# Patient Record
Sex: MALE | Race: WHITE | NOT HISPANIC OR LATINO | Employment: OTHER | URBAN - METROPOLITAN AREA
[De-identification: names, ages, dates, MRNs, and addresses within clinical notes are randomized per-mention and may not be internally consistent; named-entity substitution may affect disease eponyms.]

---

## 2023-06-09 ENCOUNTER — OFFICE VISIT (OUTPATIENT)
Dept: PRIMARY CARE CLINIC | Facility: CLINIC | Age: 78
End: 2023-06-09
Payer: MEDICARE

## 2023-06-09 VITALS
WEIGHT: 315 LBS | HEIGHT: 74 IN | HEART RATE: 54 BPM | OXYGEN SATURATION: 97 % | BODY MASS INDEX: 40.43 KG/M2 | DIASTOLIC BLOOD PRESSURE: 64 MMHG | RESPIRATION RATE: 16 BRPM | TEMPERATURE: 98 F | SYSTOLIC BLOOD PRESSURE: 103 MMHG

## 2023-06-09 DIAGNOSIS — Z85.46 HISTORY OF PROSTATE CANCER: ICD-10-CM

## 2023-06-09 DIAGNOSIS — E78.2 MIXED HYPERLIPIDEMIA: ICD-10-CM

## 2023-06-09 DIAGNOSIS — M1A.09X0 CHRONIC GOUT OF MULTIPLE SITES, UNSPECIFIED CAUSE: ICD-10-CM

## 2023-06-09 DIAGNOSIS — Z11.59 NEED FOR HEPATITIS C SCREENING TEST: ICD-10-CM

## 2023-06-09 DIAGNOSIS — G62.9 PERIPHERAL POLYNEUROPATHY: ICD-10-CM

## 2023-06-09 DIAGNOSIS — R68.89 OTHER GENERAL SYMPTOMS AND SIGNS: ICD-10-CM

## 2023-06-09 DIAGNOSIS — I48.91 ATRIAL FIBRILLATION, UNSPECIFIED TYPE: Primary | ICD-10-CM

## 2023-06-09 DIAGNOSIS — E66.01 MORBID OBESITY: ICD-10-CM

## 2023-06-09 PROCEDURE — 99204 OFFICE O/P NEW MOD 45 MIN: CPT | Mod: S$GLB,,, | Performed by: INTERNAL MEDICINE

## 2023-06-09 PROCEDURE — 99204 PR OFFICE/OUTPT VISIT, NEW, LEVL IV, 45-59 MIN: ICD-10-PCS | Mod: S$GLB,,, | Performed by: INTERNAL MEDICINE

## 2023-06-09 RX ORDER — APIXABAN 5 MG/1
1 TABLET, FILM COATED ORAL DAILY
COMMUNITY
Start: 2023-05-17 | End: 2023-07-06 | Stop reason: SDUPTHER

## 2023-06-09 RX ORDER — FLECAINIDE ACETATE 50 MG/1
1 TABLET ORAL 2 TIMES DAILY
COMMUNITY
Start: 2023-05-17

## 2023-06-09 RX ORDER — LISINOPRIL 10 MG/1
1 TABLET ORAL DAILY
COMMUNITY
Start: 2023-05-17 | End: 2024-02-08

## 2023-06-09 RX ORDER — ATROPINE SULFATE 10 MG/ML
1 SOLUTION/ DROPS OPHTHALMIC DAILY
COMMUNITY
Start: 2023-03-15

## 2023-06-09 RX ORDER — PREDNISOLONE ACETATE 10 MG/ML
1 SUSPENSION/ DROPS OPHTHALMIC DAILY
COMMUNITY
Start: 2023-03-06

## 2023-06-09 RX ORDER — ALLOPURINOL 300 MG/1
1 TABLET ORAL DAILY
COMMUNITY
Start: 2023-05-24

## 2023-06-09 RX ORDER — PREGABALIN 150 MG/1
1 CAPSULE ORAL 3 TIMES DAILY
COMMUNITY
Start: 2023-05-18 | End: 2023-09-05 | Stop reason: SDUPTHER

## 2023-06-09 RX ORDER — PRAVASTATIN SODIUM 40 MG/1
40 TABLET ORAL DAILY
COMMUNITY

## 2023-06-09 NOTE — PROGRESS NOTES
Subjective:      Patient ID: Gulshan Winkler is a 78 y.o. male.    Chief Complaint: Establish Care (Est care, discuss medication, patient reports hx neuropathy, reports moving from [place] )    HPI:  Patient is here to establish care.  Patient has moved from [place], [place].    Patient has history of atrial fibrillation s/p ablation x 3.  Last ablation was 2 years ago and reports he reported back to normal sinus rhythm and no more episode of rapid atrial fibrillation noted.  Patient is still on flecainide and Eliquis.  Patient denies any palpitation, chest pain, shortness some breath, no blood in stool or black color stool    Patient has questionable history of gout and is on allopurinol.  Has bilateral hand small joint involvement.     Patient has burning in bilateral feet x 1 year.  Feeling is constant, worse at night, nerve conduction study was not done.  Patient is using Lyrica with some help.     Review of Systems   Constitutional:  Negative for chills, diaphoresis, fever, malaise/fatigue and weight loss.   HENT:  Negative for congestion, ear pain, sinus pain, sore throat and tinnitus.    Eyes:  Negative for blurred vision and photophobia.   Respiratory:  Negative for cough, hemoptysis, shortness of breath and wheezing.    Cardiovascular:  Negative for chest pain, palpitations, orthopnea, leg swelling and PND.   Gastrointestinal:  Negative for abdominal pain, blood in stool, constipation, diarrhea, heartburn, melena, nausea and vomiting.   Genitourinary:  Negative for dysuria, frequency and urgency.   Musculoskeletal:  Negative for back pain, myalgias and neck pain.   Skin:  Negative for rash.   Neurological:  Negative for dizziness, tremors, seizures, loss of consciousness and weakness.   Endo/Heme/Allergies:  Negative for polydipsia.   Psychiatric/Behavioral:  Negative for depression and hallucinations. The patient does not have insomnia.    Objective:   /64 (BP Location: Left arm, Patient Position:  "Sitting, BP Method: Large (Automatic))   Pulse (!) 54   Temp 97.9 °F (36.6 °C) (Temporal)   Resp 16   Ht 6' 2" (1.88 m)   Wt (!) 150.1 kg (331 lb)   SpO2 97%   BMI 42.50 kg/m²   Physical Exam  Constitutional:       General: He is not in acute distress.     Appearance: He is not diaphoretic.   Neck:      Thyroid: No thyromegaly.   Cardiovascular:      Rate and Rhythm: Normal rate and regular rhythm.      Heart sounds: Normal heart sounds. No murmur heard.  Pulmonary:      Effort: Pulmonary effort is normal. No respiratory distress.      Breath sounds: Normal breath sounds. No wheezing.   Abdominal:      General: Bowel sounds are normal. There is no distension.      Palpations: Abdomen is soft.      Tenderness: There is no abdominal tenderness.   Musculoskeletal:      Comments: Multiple Heberden and Priscilla's nodules in bilateral hands   Lymphadenopathy:      Cervical: No cervical adenopathy.   Neurological:      Mental Status: He is alert and oriented to person, place, and time.   Psychiatric:         Behavior: Behavior normal.         Thought Content: Thought content normal.         Judgment: Judgment normal.   Assessment:       ICD-10-CM ICD-9-CM   1. Morbid obesity  E66.01 278.01   2. Atrial fibrillation, unspecified type  I48.91 427.31   3. Chronic gout of multiple sites, unspecified cause  M1A.09X0 274.02   4. Mixed hyperlipidemia  E78.2 272.2   5. Prostate cancer screening  Z12.5 V76.44   6. Need for hepatitis C screening test  Z11.59 V73.89       Plan:     Patient has history of atrial fibrillation s/p ablation  Patient reports heart rate is under good control and denies any symptoms suggestive of atrial fibrillation.  Patient is using Apple watch and denies Apple watch picked up any atrial fibrillation  Will refer to cardiology for Holter monitor before stopping Eliquis and flecainide  Patient has questionable gout is on allopurinol  Will check uric acid levels  Patient has hyperlipidemia and is on " pravastatin.  Will check lipid profile  Patient has symptoms suggestive of peripheral neuropathy.  Will check nerve conduction study  Patient has history of prostate cancer s/p prostatectomy  Will check PSA  Will screen patient for hepatitis-C  Advised patient about need to lose weight + minimize carbohydrate consumption      Medication List with Changes/Refills   Current Medications    ALLOPURINOL (ZYLOPRIM) 300 MG TABLET    Take 1 tablet by mouth once daily.    ATROPINE 1% (ISOPTO ATROPINE) 1 % DROP    Place 1 drop into both eyes once daily.    ELIQUIS 5 MG TAB    Take 1 tablet by mouth once daily.    FLECAINIDE (TAMBOCOR) 50 MG TAB    Take 1 tablet by mouth 2 (two) times a day.    LISINOPRIL 10 MG TABLET    Take 1 tablet by mouth once daily.    PRAVASTATIN (PRAVACHOL) 40 MG TABLET    Take 40 mg by mouth once daily.    PREDNISOLONE ACETATE (PRED FORTE) 1 % DRPS    Place 1 drop into both eyes once daily.    PREGABALIN (LYRICA) 150 MG CAPSULE    Take 1 capsule by mouth 3 (three) times daily.

## 2023-06-13 LAB
ABS NRBC COUNT: 0 THOU/UL (ref 0–0.01)
ABSOLUTE BASOPHIL: 0.1 10*3/UL (ref 0–0.3)
ABSOLUTE EOSINOPHIL: 0.2 10*3/UL (ref 0–0.6)
ABSOLUTE IMMATURE GRAN: 0.06 THOU/UL (ref 0–0.03)
ABSOLUTE LYMPHOCYTE: 1.7 10*3/UL (ref 1.2–4)
ABSOLUTE MONOCYTE: 0.7 10*3/UL (ref 0.1–0.8)
ALBUMIN SERPL BCP-MCNC: 3.7 G/DL (ref 3.4–5)
ALP SERPL-CCNC: 63 U/L (ref 45–117)
ALT SERPL W P-5'-P-CCNC: 28 U/L (ref 16–61)
ANION GAP SERPL CALC-SCNC: 2 MMOL/L (ref 3–11)
AST SERPL-CCNC: 28 U/L (ref 15–37)
BASOPHILS NFR BLD: 1.5 % (ref 0–3)
BILIRUB SERPL-MCNC: 0.8 MG/DL (ref 0.2–1)
BUN SERPL-MCNC: 20 MG/DL (ref 7–18)
BUN/CREAT SERPL: 18.69 RATIO
CALCIUM SERPL-MCNC: 8.9 MG/DL (ref 8.5–10.1)
CHLORIDE SERPL-SCNC: 108 MMOL/L (ref 98–107)
CHOLEST SERPL-MSCNC: 113 MG/DL
CO2 SERPL-SCNC: 24 MMOL/L (ref 21–32)
CREAT SERPL-MCNC: 1.07 MG/DL (ref 0.7–1.3)
EOSINOPHIL NFR BLD: 3.7 % (ref 0–6)
ERYTHROCYTE [DISTWIDTH] IN BLOOD BY AUTOMATED COUNT: 14.2 % (ref 0–15.5)
GFR ESTIMATION: > 60
GLUCOSE SERPL-MCNC: 101 MG/DL (ref 74–106)
HCT VFR BLD AUTO: 48.6 % (ref 42–52)
HCV AB SERPL QL IA: NONREACTIVE
HDLC SERPL-MCNC: 39 MG/DL
HGB BLD-MCNC: 16.2 G/DL (ref 14–18)
IMMATURE GRANULOCYTES: 0.9 % (ref 0–0.43)
LDLC SERPL CALC-MCNC: 49.6 MG/DL
LYMPHOCYTES NFR BLD: 25.7 % (ref 20–45)
MCH RBC QN AUTO: 31.3 PG (ref 27–32)
MCHC RBC AUTO-ENTMCNC: 33.3 % (ref 32–36)
MCV RBC AUTO: 94 FL (ref 80–97)
MONOCYTES NFR BLD: 10.3 % (ref 2–10)
NEUTROPHILS # BLD AUTO: 3.8 10*3/UL (ref 1.4–7)
NEUTROPHILS NFR BLD: 57.9 % (ref 50–80)
NUCLEATED RED BLOOD CELLS: 0 % (ref 0–0.2)
PLATELETS: 148 10*3/UL (ref 130–400)
PMV BLD AUTO: 10.9 FL (ref 9.2–12.2)
POTASSIUM SERPL-SCNC: 4.4 MMOL/L (ref 3.5–5.1)
PROT SERPL-MCNC: 7.5 G/DL (ref 6.4–8.2)
PSA: < 0.01 NG/ML (ref 0.1–4)
RBC # BLD AUTO: 5.17 10*6/UL (ref 4.7–6.1)
SODIUM BLD-SCNC: 134 MMOL/L (ref 131–143)
TRIGL SERPL-MCNC: 122 MG/DL (ref 0–149)
TSH SERPL DL<=0.005 MIU/L-ACNC: 0.95 UIU/ML (ref 0.36–3.74)
URATE SERPL-MCNC: 6.2 MG/DL (ref 3.5–7.2)
VLDL CHOLESTEROL: 24 MG/DL
WBC # BLD: 6.5 10*3/UL (ref 4.5–10)

## 2023-07-06 ENCOUNTER — OFFICE VISIT (OUTPATIENT)
Dept: PRIMARY CARE CLINIC | Facility: CLINIC | Age: 78
End: 2023-07-06
Payer: MEDICARE

## 2023-07-06 VITALS
DIASTOLIC BLOOD PRESSURE: 72 MMHG | HEIGHT: 74 IN | SYSTOLIC BLOOD PRESSURE: 114 MMHG | TEMPERATURE: 97 F | HEART RATE: 73 BPM | BODY MASS INDEX: 40.43 KG/M2 | RESPIRATION RATE: 16 BRPM | OXYGEN SATURATION: 98 % | WEIGHT: 315 LBS

## 2023-07-06 DIAGNOSIS — E66.01 MORBID OBESITY: Primary | ICD-10-CM

## 2023-07-06 DIAGNOSIS — I48.91 ATRIAL FIBRILLATION, UNSPECIFIED TYPE: ICD-10-CM

## 2023-07-06 DIAGNOSIS — M1A.09X0 CHRONIC GOUT OF MULTIPLE SITES, UNSPECIFIED CAUSE: ICD-10-CM

## 2023-07-06 PROCEDURE — 99214 PR OFFICE/OUTPT VISIT, EST, LEVL IV, 30-39 MIN: ICD-10-PCS | Mod: S$GLB,,, | Performed by: INTERNAL MEDICINE

## 2023-07-06 PROCEDURE — 99214 OFFICE O/P EST MOD 30 MIN: CPT | Mod: S$GLB,,, | Performed by: INTERNAL MEDICINE

## 2023-07-06 RX ORDER — APIXABAN 5 MG/1
5 TABLET, FILM COATED ORAL 2 TIMES DAILY
Qty: 60 TABLET | Refills: 1 | Status: SHIPPED | OUTPATIENT
Start: 2023-07-06

## 2023-07-06 NOTE — PROGRESS NOTES
Subjective:      Patient ID: Gulshan Winkler is a 78 y.o. male.    Chief Complaint: Follow-up (F/u discuss need to continue blood thinner, states he is taking his Eloquis 5mg every other day) and Leg Pain (C/o BLE numbness, hx of neuropathy, discuss need for test)    Patient has history of atrial fibrillation s/p ablation x 3.  Last ablation was 2 years ago and reports he has been in normal sinus rhythm since then.  Patient is still on flecainide and Eliquis.  Patient was referred to Cardiology on last visit for possible Holter monitor and evaluation of anti arrhythmic medication.  Patient reports he has not yet received an appointment with Cardiology.  Patient denies any chest pain shortness of breath palpitation, dizziness lightheadedness, blood in stool or black color stool.  Has decrease flecainide from twice a day to once a day and is taking Eliquis every other day.    Patient has burning in bilateral feet x 1 year.  Feeling is constant, worse at night, nerve conduction study was not done.  Patient is using Lyrica with some help.     Patient has morbid obesity and is loosing weight with  better adherence with diet. Patient has lost 4 lbs. Patient has MEGGAN and is sing CPAP machine regularly.     Review of Systems   Constitutional:  Negative for chills, diaphoresis, fever, malaise/fatigue and weight loss.   HENT:  Negative for congestion, ear pain, sinus pain, sore throat and tinnitus.    Eyes:  Negative for blurred vision and photophobia.   Respiratory:  Negative for cough, hemoptysis, shortness of breath and wheezing.    Cardiovascular:  Negative for chest pain, palpitations, orthopnea, leg swelling and PND.   Gastrointestinal:  Negative for abdominal pain, blood in stool, constipation, diarrhea, heartburn, melena, nausea and vomiting.   Genitourinary:  Negative for dysuria, frequency and urgency.   Musculoskeletal:  Negative for back pain, myalgias and neck pain.   Skin:  Negative for rash.   Neurological:   "Positive for tingling. Negative for dizziness, tremors, seizures, loss of consciousness and weakness.   Endo/Heme/Allergies:  Negative for polydipsia.   Psychiatric/Behavioral:  Negative for depression and hallucinations. The patient does not have insomnia.    Objective:   /72 (BP Location: Left arm, Patient Position: Sitting, BP Method: Large (Automatic))   Pulse 73   Temp 97.4 °F (36.3 °C) (Temporal)   Resp 16   Ht 6' 2" (1.88 m)   Wt (!) 148.3 kg (327 lb)   SpO2 98%   BMI 41.98 kg/m²     Physical Exam: Patient not Examined  Assessment:       ICD-10-CM ICD-9-CM   1. Morbid obesity  E66.01 278.01   2. Atrial fibrillation, unspecified type  I48.91 427.31   3. Chronic gout of multiple sites, unspecified cause  M1A.09X0 274.02       Plan:     Patient has atrial fibrillation and is on flecainide and Eliquis  Patient need Holter monitor before stopping anti arrhythmia and anticoagulation  To cardiologist but has not yet received an appointment.  Will follow-up on cardiology referral again  Advised patient to take medications as prescribed  Patient has neuropathy involving bilateral feet that is controlled with Lyrica  Nerve conduction study is ordered but not yet done  Patient has history of gout, last uric acid levels were normal  Advised patient about need to cut down on seafood + beans and red meat  Patient has morbid obesity and reports better adherence with diet and has lost 4 lb  Patient is encouraged to continue.    Medication List with Changes/Refills   Current Medications    ALLOPURINOL (ZYLOPRIM) 300 MG TABLET    Take 1 tablet by mouth once daily.    ATROPINE 1% (ISOPTO ATROPINE) 1 % DROP    Place 1 drop into both eyes once daily.    FLECAINIDE (TAMBOCOR) 50 MG TAB    Take 1 tablet by mouth 2 (two) times a day.    LISINOPRIL 10 MG TABLET    Take 1 tablet by mouth once daily.    PRAVASTATIN (PRAVACHOL) 40 MG TABLET    Take 40 mg by mouth once daily.    PREDNISOLONE ACETATE (PRED FORTE) 1 % DRPS    " Place 1 drop into both eyes once daily.    PREGABALIN (LYRICA) 150 MG CAPSULE    Take 1 capsule by mouth 3 (three) times daily.   Changed and/or Refilled Medications    Modified Medication Previous Medication    ELIQUIS 5 MG TAB ELIQUIS 5 mg Tab       Take 1 tablet (5 mg total) by mouth 2 (two) times daily.    Take 1 tablet by mouth once daily.

## 2023-08-22 ENCOUNTER — TELEPHONE (OUTPATIENT)
Dept: PRIMARY CARE CLINIC | Facility: CLINIC | Age: 78
End: 2023-08-22
Payer: MEDICARE

## 2023-08-22 NOTE — TELEPHONE ENCOUNTER
----- Message from Kulwant Schuster sent at 8/22/2023  2:55 PM CDT -----  Contact: pt wife - Mila  Pt wife is calling to get the name of the Neurologist that the pt was referred to / pt an be reached at [phone]//thanks/Dbw

## 2023-08-24 ENCOUNTER — TELEPHONE (OUTPATIENT)
Dept: PRIMARY CARE CLINIC | Facility: CLINIC | Age: 78
End: 2023-08-24

## 2023-08-24 ENCOUNTER — OFFICE VISIT (OUTPATIENT)
Dept: PRIMARY CARE CLINIC | Facility: CLINIC | Age: 78
End: 2023-08-24
Payer: MEDICARE

## 2023-08-24 VITALS
DIASTOLIC BLOOD PRESSURE: 78 MMHG | SYSTOLIC BLOOD PRESSURE: 128 MMHG | BODY MASS INDEX: 42.66 KG/M2 | TEMPERATURE: 98 F | WEIGHT: 315 LBS | HEART RATE: 58 BPM | HEIGHT: 72 IN | RESPIRATION RATE: 16 BRPM | OXYGEN SATURATION: 99 %

## 2023-08-24 DIAGNOSIS — M54.50 ACUTE RIGHT-SIDED LOW BACK PAIN WITHOUT SCIATICA: Primary | ICD-10-CM

## 2023-08-24 DIAGNOSIS — G62.9 PERIPHERAL POLYNEUROPATHY: ICD-10-CM

## 2023-08-24 DIAGNOSIS — E66.01 MORBID OBESITY: ICD-10-CM

## 2023-08-24 DIAGNOSIS — I48.91 ATRIAL FIBRILLATION, UNSPECIFIED TYPE: ICD-10-CM

## 2023-08-24 PROCEDURE — 99214 PR OFFICE/OUTPT VISIT, EST, LEVL IV, 30-39 MIN: ICD-10-PCS | Mod: S$GLB,,, | Performed by: INTERNAL MEDICINE

## 2023-08-24 PROCEDURE — 99214 OFFICE O/P EST MOD 30 MIN: CPT | Mod: S$GLB,,, | Performed by: INTERNAL MEDICINE

## 2023-08-24 RX ORDER — CYCLOBENZAPRINE HCL 5 MG
5 TABLET ORAL 3 TIMES DAILY PRN
Qty: 30 TABLET | Refills: 0 | Status: SHIPPED | OUTPATIENT
Start: 2023-08-24 | End: 2023-09-03

## 2023-08-24 NOTE — PROGRESS NOTES
Subjective:      Patient ID: Gulshan Winkler is a 78 y.o. male.    Chief Complaint: Low-back Pain (C/o low back pain, states it feels as if he has a pinched nerve. Onset 1-2 weeks prior)    : Patient has history of atrial fibrillation s/p ablation x 3.  Last ablation was 2 years ago and reports he has been in normal sinus rhythm since then.  Patient is still on flecainide and Eliquis.  Patient was referred to Cardiology on last visit for possible Holter monitor need to continue anti arrhythmic medication.  With cardiologist next week    Patient presented today with complains of low back pain for 1 week. Pain is mild-to-moderate in intensity, dull, no radiation of pain, no heavy lifting, no injury.  Patient has history of burning feet for about a year burning is constant worse at night and nerve conduction study showed diffuse peripheral neuropathy.  Patient is using Lyrica with some help    Review of Systems   Constitutional:  Negative for chills, diaphoresis, fever, malaise/fatigue and weight loss.   HENT:  Negative for congestion, ear pain, sinus pain, sore throat and tinnitus.    Eyes:  Negative for blurred vision and photophobia.   Respiratory:  Negative for cough, hemoptysis, shortness of breath and wheezing.    Cardiovascular:  Negative for chest pain, palpitations, orthopnea, leg swelling and PND.   Gastrointestinal:  Negative for abdominal pain, blood in stool, constipation, diarrhea, heartburn, melena, nausea and vomiting.   Genitourinary:  Negative for dysuria, frequency and urgency.   Musculoskeletal:  Negative for back pain, myalgias and neck pain.   Skin:  Negative for rash.   Neurological:  Positive for tingling. Negative for dizziness, tremors, seizures, loss of consciousness and weakness.   Endo/Heme/Allergies:  Negative for polydipsia.   Psychiatric/Behavioral:  Negative for depression and hallucinations. The patient does not have insomnia.      Objective:   /78 (BP Location: Left arm, Patient  Position: Sitting, BP Method: Large (Automatic))   Pulse (!) 58   Temp 97.8 °F (36.6 °C) (Temporal)   Resp 16   Ht 6' (1.829 m)   Wt (!) 147.9 kg (326 lb)   SpO2 99%   BMI 44.21 kg/m²     Physical Exam  Constitutional:       General: He is not in acute distress.     Appearance: He is not diaphoretic.   Neck:      Thyroid: No thyromegaly.   Cardiovascular:      Rate and Rhythm: Normal rate and regular rhythm.      Heart sounds: Normal heart sounds. No murmur heard.  Pulmonary:      Effort: Pulmonary effort is normal. No respiratory distress.      Breath sounds: Normal breath sounds. No wheezing.   Abdominal:      General: Bowel sounds are normal. There is no distension.      Palpations: Abdomen is soft.      Tenderness: There is no abdominal tenderness.   Lymphadenopathy:      Cervical: No cervical adenopathy.   Neurological:      Mental Status: He is alert and oriented to person, place, and time.   Psychiatric:         Behavior: Behavior normal.         Thought Content: Thought content normal.         Judgment: Judgment normal.       Assessment:       ICD-10-CM ICD-9-CM   1. Acute right-sided low back pain without sciatica  M54.50 724.2   2. Atrial fibrillation, unspecified type  I48.91 427.31   3. Morbid obesity  E66.01 278.01   4. Peripheral polyneuropathy  G62.9 356.9       Plan:     Patient has atrial fibrillation s/p ablation  Patient is on antiarrhythmic and also on Eliquis  Patient has an appointment to have Holter monitor and see if he needs to continue Eliquis and flecainide  Patient has morbid obesity and is trying to lose weight.  Advised patient to adhere with diet and exercise  Patient has acute low back pain and is tenderness in the paraspinal area  Will use muscle relaxer advised patient to use heating pad  Feet numbness and tingling and nerve conduction showed peripheral neuropathy  Symptoms are improved with Lyrica.  Will continue medication  Will check B12, RPR, A1c  TSH is  normal    Medication List with Changes/Refills   Current Medications    ALLOPURINOL (ZYLOPRIM) 300 MG TABLET    Take 1 tablet by mouth once daily.    ATROPINE 1% (ISOPTO ATROPINE) 1 % DROP    Place 1 drop into both eyes once daily.    ELIQUIS 5 MG TAB    Take 1 tablet (5 mg total) by mouth 2 (two) times daily.    FLECAINIDE (TAMBOCOR) 50 MG TAB    Take 1 tablet by mouth 2 (two) times a day.    LISINOPRIL 10 MG TABLET    Take 1 tablet by mouth once daily.    PRAVASTATIN (PRAVACHOL) 40 MG TABLET    Take 40 mg by mouth once daily.    PREDNISOLONE ACETATE (PRED FORTE) 1 % DRPS    Place 1 drop into both eyes once daily.    PREGABALIN (LYRICA) 150 MG CAPSULE    Take 1 capsule by mouth 3 (three) times daily.

## 2023-08-24 NOTE — TELEPHONE ENCOUNTER
----- Message from Kulwant Schuster sent at 8/24/2023 11:09 AM CDT -----  Contact: pt wife - Mila  Type:  Same Day Appointment Request    Caller is requesting a same day appointment.  Caller declined first available appointment listed below.      Name of Caller: pt wife   When is the first available appointment?   Symptoms: back aches/ pains  Best Call Back Number: [phone]  Additional Information:

## 2023-09-02 ENCOUNTER — NURSE TRIAGE (OUTPATIENT)
Dept: ADMINISTRATIVE | Facility: CLINIC | Age: 78
End: 2023-09-02
Payer: MEDICARE

## 2023-09-02 NOTE — TELEPHONE ENCOUNTER
Reason for Disposition   [1] Prescription refill request for ESSENTIAL medicine (i.e., likelihood of harm to patient if not taken) AND [2] triager unable to refill per department policy    Additional Information   Negative: New-onset or worsening symptoms, see that guideline (e.g., diarrhea, runny nose, sore throat)   Negative: Medicine question not related to refill or renewal   Negative: Caller (e.g., patient or pharmacist) requesting information about a new medicine   Negative: Caller requesting information unrelated to medicine    Protocols used: Medication Refill and Renewal Call-A-  Pt called re pregabalin refill. Pt states he only has 3 days left. going out of town for 2 weeks. Pt states he goes back and forth to [place]. Sees dr booker in [place]. No call schedule available for PCP. Rec to see  at UC/ED/on demand visit. Pt states he is not too savvy on computers. Unable to get an answering service at any number listed for PCP. Pt notified. Reiterate UC/ED for refill. Pt agrees

## 2023-09-06 RX ORDER — PREGABALIN 150 MG/1
150 CAPSULE ORAL 3 TIMES DAILY
Qty: 90 CAPSULE | Refills: 0 | Status: SHIPPED | OUTPATIENT
Start: 2023-09-06 | End: 2023-10-12

## 2023-09-18 ENCOUNTER — TELEPHONE (OUTPATIENT)
Dept: PRIMARY CARE CLINIC | Facility: CLINIC | Age: 78
End: 2023-09-18
Payer: MEDICARE

## 2023-09-18 NOTE — TELEPHONE ENCOUNTER
Attempted to contact patient in regard to referral request. No answer, unable to advise via voicemail due to inbox not being set up.

## 2023-10-04 ENCOUNTER — TELEPHONE (OUTPATIENT)
Dept: PRIMARY CARE CLINIC | Facility: CLINIC | Age: 78
End: 2023-10-04
Payer: MEDICARE

## 2023-10-04 NOTE — TELEPHONE ENCOUNTER
"Patient states "the muscle relaxer is not working I need something else". Pt is requesting a referral be sent to [facility]. Please advise.   "

## 2023-10-09 ENCOUNTER — TELEPHONE (OUTPATIENT)
Dept: PRIMARY CARE CLINIC | Facility: CLINIC | Age: 78
End: 2023-10-09

## 2023-10-09 DIAGNOSIS — M54.50 CHRONIC MIDLINE LOW BACK PAIN WITHOUT SCIATICA: Primary | ICD-10-CM

## 2023-10-09 DIAGNOSIS — G89.29 CHRONIC MIDLINE LOW BACK PAIN WITHOUT SCIATICA: Primary | ICD-10-CM

## 2023-10-09 NOTE — TELEPHONE ENCOUNTER
----- Message from Soha Tipton sent at 10/6/2023  1:09 PM CDT -----  Contact: Mila - wife  Patient's wife Mila calling in to request status of referral for an orthopedic. She provided the fax number for [facility], [phone], for it to be sent to. State this is her second request, has not heard back from anyone. Please call back at [phone]

## 2023-10-09 NOTE — TELEPHONE ENCOUNTER
Patient is requesting a referral be sent to [facility], for his chronic back pain. Please advise.

## 2023-10-12 ENCOUNTER — OFFICE VISIT (OUTPATIENT)
Dept: PRIMARY CARE CLINIC | Facility: CLINIC | Age: 78
End: 2023-10-12
Payer: MEDICARE

## 2023-10-12 VITALS
BODY MASS INDEX: 42.66 KG/M2 | TEMPERATURE: 98 F | HEIGHT: 72 IN | SYSTOLIC BLOOD PRESSURE: 133 MMHG | DIASTOLIC BLOOD PRESSURE: 71 MMHG | OXYGEN SATURATION: 99 % | RESPIRATION RATE: 18 BRPM | WEIGHT: 315 LBS | HEART RATE: 60 BPM

## 2023-10-12 DIAGNOSIS — I10 ESSENTIAL HYPERTENSION: ICD-10-CM

## 2023-10-12 DIAGNOSIS — Z23 FLU VACCINE NEED: Primary | ICD-10-CM

## 2023-10-12 DIAGNOSIS — G62.9 PERIPHERAL POLYNEUROPATHY: ICD-10-CM

## 2023-10-12 DIAGNOSIS — M25.551 RIGHT HIP PAIN: ICD-10-CM

## 2023-10-12 DIAGNOSIS — I48.91 ATRIAL FIBRILLATION, UNSPECIFIED TYPE: ICD-10-CM

## 2023-10-12 PROCEDURE — G0008 ADMIN INFLUENZA VIRUS VAC: HCPCS | Mod: S$GLB,,, | Performed by: INTERNAL MEDICINE

## 2023-10-12 PROCEDURE — G0008 FLU VACCINE - QUADRIVALENT - ADJUVANTED: ICD-10-PCS | Mod: S$GLB,,, | Performed by: INTERNAL MEDICINE

## 2023-10-12 PROCEDURE — 90694 VACC AIIV4 NO PRSRV 0.5ML IM: CPT | Mod: S$GLB,,, | Performed by: INTERNAL MEDICINE

## 2023-10-12 PROCEDURE — 90694 FLU VACCINE - QUADRIVALENT - ADJUVANTED: ICD-10-PCS | Mod: S$GLB,,, | Performed by: INTERNAL MEDICINE

## 2023-10-12 PROCEDURE — 99214 OFFICE O/P EST MOD 30 MIN: CPT | Mod: S$GLB,,, | Performed by: INTERNAL MEDICINE

## 2023-10-12 PROCEDURE — 99214 PR OFFICE/OUTPT VISIT, EST, LEVL IV, 30-39 MIN: ICD-10-PCS | Mod: S$GLB,,, | Performed by: INTERNAL MEDICINE

## 2023-10-12 RX ORDER — DULOXETIN HYDROCHLORIDE 30 MG/1
30 CAPSULE, DELAYED RELEASE ORAL DAILY
Qty: 30 CAPSULE | Refills: 11 | Status: SHIPPED | OUTPATIENT
Start: 2023-10-12 | End: 2024-10-11

## 2023-10-12 RX ORDER — TRAMADOL HYDROCHLORIDE 50 MG/1
50 TABLET ORAL EVERY 6 HOURS PRN
Qty: 15 EACH | Refills: 0 | Status: SHIPPED | OUTPATIENT
Start: 2023-10-12

## 2023-10-12 NOTE — PROGRESS NOTES
Subjective:      Patient ID: Gulshan Winkler is a 78 y.o. male.    Chief Complaint: Follow-up    : Patient has history of atrial fibrillation s/p ablation x 3.  Last ablation was 2 years ago and reports he has been in normal sinus rhythm since then.  Patient is still on flecainide and Eliquis. Patient was evaluated by DR. Crook and was recommended to continue with the medications. Patient has HTN and BP previously was under good control but is running high today. High BP is attributed to severe hip pain.    Patient has history of burning feet for about a year burning is constant worse at night and nerve conduction study showed diffuse peripheral neuropathy.  Patient is using Lyrica with some help. Patient has tried Gabapentin without much help.    Patient today complains of right hip pain for long.  Pain is constant, sharp, worsened with activity, improved with rest.  Patient denies any recent injury.  Patient has an appointment with orthopedic.       Review of Systems   Constitutional:  Negative for chills, diaphoresis, fever, malaise/fatigue and weight loss.   HENT:  Negative for congestion, ear pain, sinus pain, sore throat and tinnitus.    Eyes:  Negative for blurred vision and photophobia.   Respiratory:  Negative for cough, hemoptysis, shortness of breath and wheezing.    Cardiovascular:  Negative for chest pain, palpitations, orthopnea, leg swelling and PND.   Gastrointestinal:  Negative for abdominal pain, blood in stool, constipation, diarrhea, heartburn, melena, nausea and vomiting.   Genitourinary:  Negative for dysuria, frequency and urgency.   Musculoskeletal:  Positive for joint pain. Negative for back pain, myalgias and neck pain.   Skin:  Negative for rash.   Neurological:  Negative for dizziness, tremors, seizures, loss of consciousness and weakness.   Endo/Heme/Allergies:  Negative for polydipsia.   Psychiatric/Behavioral:  Negative for depression and hallucinations. The patient does not have  insomnia.      Objective:   BP (!) 145/87 (BP Location: Left arm, Patient Position: Sitting, BP Method: X-Large (Automatic))   Pulse 60   Temp 97.5 °F (36.4 °C) (Temporal)   Resp 18   Ht 6' (1.829 m)   Wt (!) 149.2 kg (329 lb)   SpO2 99%   BMI 44.62 kg/m²     Physical Exam  Constitutional:       General: He is not in acute distress.     Appearance: He is not diaphoretic.   Neck:      Thyroid: No thyromegaly.   Cardiovascular:      Rate and Rhythm: Normal rate and regular rhythm.      Heart sounds: Normal heart sounds. No murmur heard.  Pulmonary:      Effort: Pulmonary effort is normal. No respiratory distress.      Breath sounds: Normal breath sounds. No wheezing.   Abdominal:      General: Bowel sounds are normal. There is no distension.      Palpations: Abdomen is soft.      Tenderness: There is no abdominal tenderness.   Musculoskeletal:      Comments: Right Groin area tenderness.   ROM is normal.    Lymphadenopathy:      Cervical: No cervical adenopathy.   Neurological:      Mental Status: He is alert and oriented to person, place, and time.   Psychiatric:         Behavior: Behavior normal.         Thought Content: Thought content normal.         Judgment: Judgment normal.       Assessment:       ICD-10-CM ICD-9-CM   1. Right hip pain  M25.551 719.45   2. Peripheral polyneuropathy  G62.9 356.9   3. Flu vaccine need  Z23 V04.81   4. Atrial fibrillation, unspecified type  I48.91 427.31   5. Essential hypertension  I10 401.9       Plan:     Patient with hypertension and blood pressure seem to be running high today  Blood pressure previously were under good control.    Pain can be contributing to high blood pressure.  Will repeat blood pressure.  Repeat blood pressures are better controlled. Will not change medication for now  Patient has right hip pain and has an appointment with orthopedic advised patient to keep appointment  Will give tramadol for pain  Patient has peripheral neuropathy and is using  Lyrica with little help  Will try Cymbalta  Patient has atrial fibrillation and is on flecainide and Eliquis..  Patient is evaluated by cardiologist  Will get notes from Dr. Crook  Will administer flu vaccine    Medication List with Changes/Refills   New Medications    DULOXETINE (CYMBALTA) 30 MG CAPSULE    Take 1 capsule (30 mg total) by mouth once daily.    TRAMADOL (ULTRAM) 50 MG TABLET    Take 1 tablet (50 mg total) by mouth every 6 (six) hours as needed for Pain.   Current Medications    ALLOPURINOL (ZYLOPRIM) 300 MG TABLET    Take 1 tablet by mouth once daily.    ATROPINE 1% (ISOPTO ATROPINE) 1 % DROP    Place 1 drop into both eyes once daily.    ELIQUIS 5 MG TAB    Take 1 tablet (5 mg total) by mouth 2 (two) times daily.    FLECAINIDE (TAMBOCOR) 50 MG TAB    Take 1 tablet by mouth 2 (two) times a day.    LISINOPRIL 10 MG TABLET    Take 1 tablet by mouth once daily.    PRAVASTATIN (PRAVACHOL) 40 MG TABLET    Take 40 mg by mouth once daily.    PREDNISOLONE ACETATE (PRED FORTE) 1 % DRPS    Place 1 drop into both eyes once daily.    PREGABALIN (LYRICA) 150 MG CAPSULE    Take 1 capsule (150 mg total) by mouth 3 (three) times daily.

## 2023-10-23 RX ORDER — PREGABALIN 150 MG/1
150 CAPSULE ORAL 3 TIMES DAILY
Qty: 90 CAPSULE | OUTPATIENT
Start: 2023-10-23

## 2024-01-17 ENCOUNTER — TELEPHONE (OUTPATIENT)
Dept: PRIMARY CARE CLINIC | Facility: CLINIC | Age: 79
End: 2024-01-17
Payer: MEDICARE

## 2024-01-24 ENCOUNTER — TELEPHONE (OUTPATIENT)
Dept: PRIMARY CARE CLINIC | Facility: CLINIC | Age: 79
End: 2024-01-24

## 2024-01-24 ENCOUNTER — OFFICE VISIT (OUTPATIENT)
Dept: PRIMARY CARE CLINIC | Facility: CLINIC | Age: 79
End: 2024-01-24
Payer: MEDICARE

## 2024-01-24 VITALS
HEIGHT: 72 IN | WEIGHT: 314.63 LBS | RESPIRATION RATE: 18 BRPM | OXYGEN SATURATION: 96 % | HEART RATE: 61 BPM | SYSTOLIC BLOOD PRESSURE: 136 MMHG | DIASTOLIC BLOOD PRESSURE: 75 MMHG | TEMPERATURE: 98 F | BODY MASS INDEX: 42.61 KG/M2

## 2024-01-24 DIAGNOSIS — Z01.818 PRE-OP EVALUATION: Primary | ICD-10-CM

## 2024-01-24 DIAGNOSIS — I48.91 ATRIAL FIBRILLATION, UNSPECIFIED TYPE: ICD-10-CM

## 2024-01-24 DIAGNOSIS — E66.01 MORBID OBESITY: ICD-10-CM

## 2024-01-24 PROCEDURE — 99214 OFFICE O/P EST MOD 30 MIN: CPT | Mod: S$GLB,,, | Performed by: INTERNAL MEDICINE

## 2024-01-24 NOTE — PROGRESS NOTES
Subjective:      Patient ID: Gulshan Winkler is a 78 y.o. male.    Chief Complaint: Follow-up    : Patient has history of atrial fibrillation s/p ablation x 3.  Last ablation was 2 years ago and reports he has been in normal sinus rhythm since then.  Patient is still on flecainide and Eliquis. Patient was evaluated by Dr. Crook and was recommended to continue with the medications.  Has hypertension and blood pressure seem under good control. Patient is planning to have right hip arthroplasty. Patient is here for preop evaluation.  [facility] but are not available for review.   Patient denies any chest pain shortness of breath, ankle swelling.  Cardiologist has already cleared patient for surgery as well.      Review of Systems   Constitutional:  Positive for weight loss (15 lbs weight loss). Negative for chills, diaphoresis, fever and malaise/fatigue.   HENT:  Negative for congestion, ear pain, sinus pain, sore throat and tinnitus.    Eyes:  Negative for blurred vision and photophobia.   Respiratory:  Negative for cough, hemoptysis, shortness of breath and wheezing.    Cardiovascular:  Negative for chest pain, palpitations, orthopnea, leg swelling and PND.   Gastrointestinal:  Negative for abdominal pain, blood in stool, constipation, diarrhea, heartburn, melena, nausea and vomiting.   Genitourinary:  Negative for dysuria, frequency and urgency.   Musculoskeletal:  Positive for joint pain. Negative for back pain, myalgias and neck pain.   Skin:  Negative for rash.   Neurological:  Negative for dizziness, tremors, seizures, loss of consciousness and weakness.   Endo/Heme/Allergies:  Negative for polydipsia.   Psychiatric/Behavioral:  Negative for depression and hallucinations. The patient does not have insomnia.      Objective:   /75 (BP Location: Left arm, Patient Position: Sitting, BP Method: Large (Automatic))   Pulse 61   Temp 98.1 °F (36.7 °C) (Oral)   Resp 18   Ht  6' (1.829 m)   Wt (!) 142.7 kg (314 lb 9.6 oz)   SpO2 96%   BMI 42.67 kg/m²     Physical Exam  Constitutional:       General: He is not in acute distress.     Appearance: He is obese. He is not diaphoretic.   Neck:      Thyroid: No thyromegaly.   Cardiovascular:      Rate and Rhythm: Normal rate and regular rhythm.      Heart sounds: Normal heart sounds. No murmur heard.  Pulmonary:      Effort: Pulmonary effort is normal. No respiratory distress.      Breath sounds: Normal breath sounds. No wheezing.   Abdominal:      General: Bowel sounds are normal. There is no distension.      Palpations: Abdomen is soft.      Tenderness: There is no abdominal tenderness.   Lymphadenopathy:      Cervical: No cervical adenopathy.   Neurological:      Mental Status: He is alert and oriented to person, place, and time.   Psychiatric:         Behavior: Behavior normal.         Thought Content: Thought content normal.         Judgment: Judgment normal.       Assessment:       ICD-10-CM ICD-9-CM   1. Pre-op evaluation  Z01.818 V72.84   2. Morbid obesity  E66.01 278.01   3. Atrial fibrillation, unspecified type  I48.91 427.31       Plan:     History of atrial fibrillation s/p ablation x 3 in normal sinus rhythm  Patient is evaluated by cardiologist and has recommended to continue anticoagulation  Patient has morbid obesity with BMI more than 42 but is more adherent to diet and has lost 15 lb  Patient is encouraged to continue  I think weight loss will help patient markedly  [facility]  Will get reports.   Patient will be low cardiac risk for a noncardiac moderate risk surgery.  If labs and EKG is okay  Patient will stop Eliquis 2 days before the surgery + will not take medicine on the day of surgery  Will start the medicine day after surgery if hemostasis is achieved and surgeon is comfortable restarting medicine  Patient has already been cleared by cardiologist.  Will get  cardiologist note as well    Medication List with Changes/Refills   Current Medications    ALLOPURINOL (ZYLOPRIM) 300 MG TABLET    Take 1 tablet by mouth once daily.    ATROPINE 1% (ISOPTO ATROPINE) 1 % DROP    Place 1 drop into both eyes once daily.    DULOXETINE (CYMBALTA) 30 MG CAPSULE    Take 1 capsule (30 mg total) by mouth once daily.    ELIQUIS 5 MG TAB    Take 1 tablet (5 mg total) by mouth 2 (two) times daily.    FLECAINIDE (TAMBOCOR) 50 MG TAB    Take 1 tablet by mouth 2 (two) times a day.    LISINOPRIL 10 MG TABLET    Take 1 tablet by mouth once daily.    PRAVASTATIN (PRAVACHOL) 40 MG TABLET    Take 40 mg by mouth once daily.    PREDNISOLONE ACETATE (PRED FORTE) 1 % DRPS    Place 1 drop into both eyes once daily.    TRAMADOL (ULTRAM) 50 MG TABLET    Take 1 tablet (50 mg total) by mouth every 6 (six) hours as needed for Pain.

## 2024-01-24 NOTE — TELEPHONE ENCOUNTER
Called Dr. Anderson's office LVM to have lab results faxed over for pre op clearance. Contatced Dr. Vasquez's office, they will fax over imaging results. Verbalized understanding.

## 2024-01-31 ENCOUNTER — TELEPHONE (OUTPATIENT)
Dept: PRIMARY CARE CLINIC | Facility: CLINIC | Age: 79
End: 2024-01-31
Payer: MEDICARE

## 2024-01-31 NOTE — TELEPHONE ENCOUNTER
----- Message from Samara Chen sent at 1/31/2024 10:26 AM CST -----  Patient is calling in regards to lab results please call him back at [phone] (home)             Thanks  althea

## 2024-01-31 NOTE — TELEPHONE ENCOUNTER
Patient needs f/u appt   Returned call to patient and advised clearance was sent to Ortho. Patient also mentioned non healing scab on face, advised to schedule f/u appt so MD can eval.

## 2024-02-08 ENCOUNTER — OFFICE VISIT (OUTPATIENT)
Dept: PRIMARY CARE CLINIC | Facility: CLINIC | Age: 79
End: 2024-02-08
Payer: MEDICARE

## 2024-02-08 VITALS
HEIGHT: 72 IN | BODY MASS INDEX: 41.62 KG/M2 | OXYGEN SATURATION: 96 % | DIASTOLIC BLOOD PRESSURE: 84 MMHG | TEMPERATURE: 98 F | SYSTOLIC BLOOD PRESSURE: 138 MMHG | WEIGHT: 307.31 LBS | RESPIRATION RATE: 18 BRPM | HEART RATE: 63 BPM

## 2024-02-08 DIAGNOSIS — L57.0 ACTINIC KERATOSIS: Primary | ICD-10-CM

## 2024-02-08 DIAGNOSIS — I10 ESSENTIAL HYPERTENSION: ICD-10-CM

## 2024-02-08 PROBLEM — E66.01 MORBID OBESITY: Status: RESOLVED | Noted: 2023-06-09 | Resolved: 2024-02-08

## 2024-02-08 PROCEDURE — 99214 OFFICE O/P EST MOD 30 MIN: CPT | Mod: S$GLB,,, | Performed by: INTERNAL MEDICINE

## 2024-02-08 RX ORDER — LISINOPRIL 20 MG/1
20 TABLET ORAL DAILY
Qty: 90 TABLET | Refills: 3 | Status: SHIPPED | OUTPATIENT
Start: 2024-02-08 | End: 2025-02-07

## 2024-02-08 NOTE — PROGRESS NOTES
Subjective:      Patient ID: Gulshan Winkler is a 78 y.o. male.    Chief Complaint: Rash (Scab on left side of face )    :  Patient with multiple medical problem including atrial fibrillation s/p ablation x 3.  Patient is in normal sinus rhythm since then and is still on Eliquis and flecainide.  Patient is being followed by Dr. Crook.  Patient has hypertension and blood pressure seem to be persistently higher than 130.  Patient is taking lisinopril 10 mg    Patient presented today with complains of skin lesion on left maxillary prominence.  The lesion is whitish in color slightly raised with some scaling.  Patient has previous history of skin cancer on the face.        Review of Systems   Constitutional:  Positive for weight loss (7lbs weight loss). Negative for chills, diaphoresis, fever and malaise/fatigue.   HENT:  Negative for congestion, ear pain, sinus pain, sore throat and tinnitus.    Eyes:  Negative for blurred vision and photophobia.   Respiratory:  Negative for cough, hemoptysis, shortness of breath and wheezing.    Cardiovascular:  Negative for chest pain, palpitations, orthopnea, leg swelling and PND.   Gastrointestinal:  Negative for abdominal pain, blood in stool, constipation, diarrhea, heartburn, melena, nausea and vomiting.   Genitourinary:  Negative for dysuria, frequency and urgency.   Musculoskeletal:  Negative for back pain, myalgias and neck pain.   Skin:  Positive for rash.   Neurological:  Negative for dizziness, tremors, seizures, loss of consciousness and weakness.   Endo/Heme/Allergies:  Negative for polydipsia.   Psychiatric/Behavioral:  Negative for depression and hallucinations. The patient does not have insomnia.      Objective:   /84 (BP Location: Right arm, Patient Position: Sitting, BP Method: Large (Automatic))   Pulse 63   Temp 98 °F (36.7 °C) (Oral)   Resp 18   Ht 6' (1.829 m)   Wt (!) 139.4 kg (307 lb 4.8 oz)   SpO2 96%   BMI 41.68 kg/m²     Physical Exam: 2 mm  small raised lesion with some scaling. No Telangectasia + ulcer noted.     Assessment:       ICD-10-CM ICD-9-CM   1. Actinic keratosis  L57.0 702.0   2. BMI 40.0-44.9, adult  Z68.41 V85.41   3. Essential hypertension  I10 401.9       Plan:     Patient facial skin lesions seem to be actinic keratosis  Will refer to dermatologist  Patient blood pressures have been running high will increase lisinopril to 20 mg  Patient has been more adherent to diet and is losing weight  Patient is encouraged to continue    Medication List with Changes/Refills   Current Medications    ALLOPURINOL (ZYLOPRIM) 300 MG TABLET    Take 1 tablet by mouth once daily.    ATROPINE 1% (ISOPTO ATROPINE) 1 % DROP    Place 1 drop into both eyes once daily.    DULOXETINE (CYMBALTA) 30 MG CAPSULE    Take 1 capsule (30 mg total) by mouth once daily.    ELIQUIS 5 MG TAB    Take 1 tablet (5 mg total) by mouth 2 (two) times daily.    FLECAINIDE (TAMBOCOR) 50 MG TAB    Take 1 tablet by mouth 2 (two) times a day.    PRAVASTATIN (PRAVACHOL) 40 MG TABLET    Take 40 mg by mouth once daily.    PREDNISOLONE ACETATE (PRED FORTE) 1 % DRPS    Place 1 drop into both eyes once daily.    TRAMADOL (ULTRAM) 50 MG TABLET    Take 1 tablet (50 mg total) by mouth every 6 (six) hours as needed for Pain.   Discontinued Medications    LISINOPRIL 10 MG TABLET    Take 1 tablet by mouth once daily.

## 2024-03-01 ENCOUNTER — TELEPHONE (OUTPATIENT)
Dept: PRIMARY CARE CLINIC | Facility: CLINIC | Age: 79
End: 2024-03-01
Payer: MEDICARE

## 2024-03-01 NOTE — TELEPHONE ENCOUNTER
----- Message from Any Araya MA sent at 2/28/2024  4:56 PM CST -----  Contact: self    ----- Message -----  From: Luz Kolb  Sent: 2/28/2024   3:56 PM CST  To: [facility] Staff    Patient is requesting a call back regarding referral for dermatologist needs to be faxed over again to Dr. Keyes Please call back at [phone]

## 2024-06-25 DIAGNOSIS — G62.9 PERIPHERAL POLYNEUROPATHY: ICD-10-CM

## 2024-06-25 RX ORDER — DULOXETIN HYDROCHLORIDE 30 MG/1
30 CAPSULE, DELAYED RELEASE ORAL
Qty: 90 CAPSULE | Refills: 1 | Status: SHIPPED | OUTPATIENT
Start: 2024-06-25

## 2024-09-04 DIAGNOSIS — G62.9 PERIPHERAL POLYNEUROPATHY: ICD-10-CM

## 2024-09-05 RX ORDER — DULOXETIN HYDROCHLORIDE 30 MG/1
30 CAPSULE, DELAYED RELEASE ORAL DAILY
Qty: 90 CAPSULE | Refills: 1 | Status: SHIPPED | OUTPATIENT
Start: 2024-09-05

## 2024-09-30 ENCOUNTER — TELEPHONE (OUTPATIENT)
Dept: PRIMARY CARE CLINIC | Facility: CLINIC | Age: 79
End: 2024-09-30
Payer: MEDICARE

## 2024-09-30 NOTE — TELEPHONE ENCOUNTER
----- Message from Christina sent at 9/30/2024  1:49 PM CDT -----  Regarding: Call Back  Contact: Mila, Wife  Type:  Patient  Call Back     Who Called: Mila   Who Left Message for Patient:wife  Does the patient know what this is regarding?:question regarding medication   Would the patient rather a call back or a response via [facility]? Call back   Best Call Back Number: [phone] (work)    Additional Information:      Thanks,  REBEKAH

## 2024-10-04 ENCOUNTER — PATIENT MESSAGE (OUTPATIENT)
Dept: PRIMARY CARE CLINIC | Facility: CLINIC | Age: 79
End: 2024-10-04
Payer: MEDICARE

## 2024-10-04 ENCOUNTER — TELEPHONE (OUTPATIENT)
Dept: PRIMARY CARE CLINIC | Facility: CLINIC | Age: 79
End: 2024-10-04
Payer: MEDICARE

## 2024-10-04 NOTE — TELEPHONE ENCOUNTER
Please offer patient appt on Monday 10/07/24 if available for eval.  Returned call to Mila and advised to stop Tramadol, she states she will give patient [place] until Dr. Trejo can evaluate him. Further advised Dr. Trejo is out today and will not be back until Monday.

## 2024-10-04 NOTE — TELEPHONE ENCOUNTER
----- Message from Luz sent at 10/4/2024  4:26 PM CDT -----  Contact: felicity - spouse  Patient is requesting a call back regarding speak with office. Please call back at [phone]

## 2024-10-04 NOTE — TELEPHONE ENCOUNTER
"Patient's family replied to your message and states, "  All tests came back negative but I've asked them to treat him for a UTI.  I'm sure he has a UTI. I also asked them to do a culture to further check for UTI.... They told us to fu with you. He not going to take tramadol. But he needs something for the pain. Tylenol doesn't do anything. He has hydrocodone. He will take that instead of tramadol..."  "

## 2024-10-07 ENCOUNTER — OFFICE VISIT (OUTPATIENT)
Dept: PRIMARY CARE CLINIC | Facility: CLINIC | Age: 79
End: 2024-10-07
Payer: MEDICARE

## 2024-10-07 ENCOUNTER — TELEPHONE (OUTPATIENT)
Dept: PRIMARY CARE CLINIC | Facility: CLINIC | Age: 79
End: 2024-10-07

## 2024-10-07 VITALS
HEIGHT: 72 IN | BODY MASS INDEX: 41.68 KG/M2 | DIASTOLIC BLOOD PRESSURE: 65 MMHG | RESPIRATION RATE: 16 BRPM | TEMPERATURE: 98 F | OXYGEN SATURATION: 96 % | HEART RATE: 69 BPM | SYSTOLIC BLOOD PRESSURE: 108 MMHG

## 2024-10-07 DIAGNOSIS — H43.11 VITREOUS HEMORRHAGE, RIGHT EYE: Primary | ICD-10-CM

## 2024-10-07 DIAGNOSIS — R44.3 HALLUCINATION: ICD-10-CM

## 2024-10-07 PROCEDURE — 99215 OFFICE O/P EST HI 40 MIN: CPT | Mod: S$PBB,,, | Performed by: INTERNAL MEDICINE

## 2024-10-07 RX ORDER — HYDROCODONE BITARTRATE AND ACETAMINOPHEN 5; 325 MG/1; MG/1
1 TABLET ORAL EVERY 6 HOURS PRN
COMMUNITY
Start: 2024-09-30

## 2024-10-07 NOTE — TELEPHONE ENCOUNTER
I hope he is doing better.  Please make an appointment for today, so we can discuss plan of care.  I think there is an opening at 1:30 p.m. if that suits you please let us know

## 2024-10-07 NOTE — TELEPHONE ENCOUNTER
----- Message from Mary Jo sent at 10/7/2024  1:45 PM CDT -----  Contact: felicity  Call Type: Patient is Running Late to Appt (tried office extensions, but didn't get an answer)    Name: felicity  Best call back number: [phone]  Scheduled appointment time: 1:30  Expected time of arrival: 1:50  Additional information: Advised patient of 15-minute riri period policy.  Pt went to wrong address

## 2024-11-06 ENCOUNTER — E-VISIT (OUTPATIENT)
Dept: PRIMARY CARE CLINIC | Facility: CLINIC | Age: 79
End: 2024-11-06
Payer: MEDICARE

## 2024-11-06 DIAGNOSIS — B02.9 HERPES ZOSTER WITHOUT COMPLICATION: Primary | ICD-10-CM

## 2024-11-07 RX ORDER — VALACYCLOVIR HYDROCHLORIDE 1 G/1
1000 TABLET, FILM COATED ORAL 3 TIMES DAILY
Qty: 21 TABLET | Refills: 0 | Status: SHIPPED | OUTPATIENT
Start: 2024-11-07 | End: 2024-11-14

## 2024-11-07 NOTE — PROGRESS NOTES
The skin lesions do look like shingles.   Will treat with Valcyclovir 1000 mg PO TID   Please avoid touching th lesions and keep them covered.   Stay away from people till these lesion start crusting.

## 2024-11-13 ENCOUNTER — OFFICE VISIT (OUTPATIENT)
Dept: PRIMARY CARE CLINIC | Facility: CLINIC | Age: 79
End: 2024-11-13
Payer: MEDICARE

## 2024-11-13 VITALS
BODY MASS INDEX: 40.77 KG/M2 | WEIGHT: 301 LBS | OXYGEN SATURATION: 97 % | HEIGHT: 72 IN | SYSTOLIC BLOOD PRESSURE: 105 MMHG | HEART RATE: 70 BPM | TEMPERATURE: 98 F | DIASTOLIC BLOOD PRESSURE: 70 MMHG | RESPIRATION RATE: 16 BRPM

## 2024-11-13 DIAGNOSIS — B02.9 HERPES ZOSTER WITHOUT COMPLICATION: Primary | ICD-10-CM

## 2024-11-13 DIAGNOSIS — I48.91 ATRIAL FIBRILLATION, UNSPECIFIED TYPE: ICD-10-CM

## 2024-11-13 PROCEDURE — 99214 OFFICE O/P EST MOD 30 MIN: CPT | Mod: S$PBB,,, | Performed by: INTERNAL MEDICINE

## 2024-11-13 RX ORDER — GABAPENTIN 300 MG/1
300 CAPSULE ORAL 3 TIMES DAILY
Qty: 90 CAPSULE | Refills: 0 | Status: SHIPPED | OUTPATIENT
Start: 2024-11-13 | End: 2025-11-13

## 2024-11-13 NOTE — PROGRESS NOTES
Subjective:      Patient ID: Gulshan Winkler is a 79 y.o. male.    Chief Complaint: Follow-up (1month f/u reports going to Urgent Care for shingles, received valtrex)    Patient with multiple medical problem including atrial fibrillation s/p ablation x 3.  Patient is in normal sinus rhythm since then and is still on Eliquis and flecainide.  Patient is being followed by Dr. Crook.  Reports cardiologist recommended to continue flecainide and Eliquis.  Patient denies any blood in stool or black color stool    Patient recently had shingle in lower back and was given valacyclovir.  Patient reports lesions are improving but there are still some lesion which are not crusted.     Review of Systems   Constitutional:  Negative for chills, diaphoresis, fever, malaise/fatigue and weight loss.   HENT:  Negative for congestion, ear pain, sinus pain, sore throat and tinnitus.    Eyes:  Negative for blurred vision and photophobia.   Respiratory:  Negative for cough, hemoptysis, shortness of breath and wheezing.    Cardiovascular:  Negative for chest pain, palpitations, orthopnea, leg swelling and PND.   Gastrointestinal:  Negative for abdominal pain, blood in stool, constipation, diarrhea, heartburn, melena, nausea and vomiting.   Genitourinary:  Negative for dysuria, frequency and urgency.   Musculoskeletal:  Negative for back pain, myalgias and neck pain.   Skin:  Negative for rash.   Neurological:  Negative for dizziness, tremors, seizures, loss of consciousness and weakness.   Endo/Heme/Allergies:  Negative for polydipsia.   Psychiatric/Behavioral:  Negative for depression and hallucinations. The patient does not have insomnia.    Objective:   /70 (BP Location: Left arm, Patient Position: Sitting)   Pulse 70   Temp 98 °F (36.7 °C) (Oral)   Resp 16   Ht 6' (1.829 m)   Wt (!) 136.5 kg (301 lb)   SpO2 97%   BMI 40.82 kg/m²     Physical Exam  Constitutional:       General: He is not in acute distress.     Appearance:  He is not diaphoretic.   Neck:      Thyroid: No thyromegaly.   Cardiovascular:      Rate and Rhythm: Normal rate and regular rhythm.      Heart sounds: Normal heart sounds. No murmur heard.  Pulmonary:      Effort: Pulmonary effort is normal. No respiratory distress.      Breath sounds: Normal breath sounds. No wheezing.   Abdominal:      General: Bowel sounds are normal. There is no distension.      Palpations: Abdomen is soft.      Tenderness: There is no abdominal tenderness.   Lymphadenopathy:      Cervical: No cervical adenopathy.   Skin:     Comments: Numerous blister noted along the T12 nerve root on left side.   Starting from flank all the way close to umbilicus  Most the lesions have started crusting   Neurological:      Mental Status: He is alert and oriented to person, place, and time.   Psychiatric:         Behavior: Behavior normal.         Thought Content: Thought content normal.         Judgment: Judgment normal.     :     Assessment:     No diagnosis found.    Plan:     Patient with atrial fibrillation s/p ablation  Patient is currently on flecainide and Eliquis  Will continue medication  Patient had shingles that was treated with valacyclovir  Lesion seem to be improving  Patient still complains of pain that is mild-to-moderate but burning in nature  Will call in gabapentin    Medication List with Changes/Refills   Current Medications    ALLOPURINOL (ZYLOPRIM) 300 MG TABLET    Take 1 tablet by mouth once daily.    ELIQUIS 5 MG TAB    Take 1 tablet (5 mg total) by mouth 2 (two) times daily.    FLECAINIDE (TAMBOCOR) 50 MG TAB    Take 1 tablet by mouth 2 (two) times a day.    HYDROCODONE-ACETAMINOPHEN (NORCO) 5-325 MG PER TABLET    Take 1 tablet by mouth every 6 (six) hours as needed for Pain.    LISINOPRIL (PRINIVIL,ZESTRIL) 20 MG TABLET    Take 1 tablet (20 mg total) by mouth once daily.    PRAVASTATIN (PRAVACHOL) 40 MG TABLET    Take 40 mg by mouth once daily.   Discontinued Medications     VALACYCLOVIR (VALTREX) 1000 MG TABLET    Take 1 tablet (1,000 mg total) by mouth 3 (three) times daily. for 7 days

## 2024-11-22 DIAGNOSIS — R44.3 HALLUCINATIONS: Primary | ICD-10-CM

## 2024-12-12 DIAGNOSIS — B02.9 HERPES ZOSTER WITHOUT COMPLICATION: ICD-10-CM

## 2024-12-12 RX ORDER — GABAPENTIN 300 MG/1
300 CAPSULE ORAL 3 TIMES DAILY
Qty: 90 CAPSULE | Refills: 0 | Status: SHIPPED | OUTPATIENT
Start: 2024-12-12

## 2025-01-12 DIAGNOSIS — B02.9 HERPES ZOSTER WITHOUT COMPLICATION: ICD-10-CM

## 2025-01-13 RX ORDER — GABAPENTIN 300 MG/1
300 CAPSULE ORAL 3 TIMES DAILY
Qty: 90 CAPSULE | Refills: 0 | Status: SHIPPED | OUTPATIENT
Start: 2025-01-13

## 2025-02-26 DIAGNOSIS — N52.9 ERECTILE DISORDER: Primary | ICD-10-CM

## 2025-03-24 ENCOUNTER — OFFICE VISIT (OUTPATIENT)
Dept: UROLOGY | Facility: CLINIC | Age: 80
End: 2025-03-24
Payer: MEDICARE

## 2025-03-24 VITALS
HEART RATE: 57 BPM | SYSTOLIC BLOOD PRESSURE: 148 MMHG | WEIGHT: 300 LBS | RESPIRATION RATE: 18 BRPM | OXYGEN SATURATION: 98 % | HEIGHT: 74 IN | DIASTOLIC BLOOD PRESSURE: 88 MMHG | BODY MASS INDEX: 38.5 KG/M2

## 2025-03-24 DIAGNOSIS — N52.9 ERECTILE DISORDER: ICD-10-CM

## 2025-03-24 DIAGNOSIS — C61 PROSTATE CANCER: Primary | ICD-10-CM

## 2025-03-24 DIAGNOSIS — N39.3 MALE STRESS INCONTINENCE: ICD-10-CM

## 2025-03-24 LAB — PSA, DIAGNOSTIC: <0.014 NG/ML (ref 0–4)

## 2025-03-24 PROCEDURE — 99204 OFFICE O/P NEW MOD 45 MIN: CPT | Mod: S$PBB,,, | Performed by: NURSE PRACTITIONER

## 2025-03-24 RX ORDER — TRAMADOL HYDROCHLORIDE 50 MG/1
TABLET ORAL
COMMUNITY
Start: 2025-03-20

## 2025-03-24 RX ORDER — DULOXETIN HYDROCHLORIDE 30 MG/1
30 CAPSULE, DELAYED RELEASE ORAL
COMMUNITY

## 2025-03-24 NOTE — PROGRESS NOTES
Subjective:       Patient ID: Gulshan Winkler is a 79 y.o. male.    Chief Complaint: Urinary Incontinence, Establish Care, and Erectile Dysfunction      HPI: 79-year-old male, new to [facility], presents with complaint of erectile dysfunction.    Patient has a history of prostate cancer with a radical prostatectomy in 2018 at [facility].    Patient states that since the surgery he has been having erectile dysfunction.    Patient states that he is unable to get an erection when he wants an erection.  He states he does will occasionally have a small morning erection.  He has tried tadalafil and sildenafil with no improvement.  Patient has a history of stress incontinence secondary to prostatectomy.    Patient states he has leakage associated with lifting or straining.  He wears pads.  Goes through 1-2 pads per day.    He denies any pain or burning urination.  Denies any odor to the urine.  Denies any fever or body aches.  Denies any blood in urine.  Denies any unexpected weight loss.  Denies any new onset bone pain.    No other urinary complaints at this time.         Past Medical History:   Past Medical History:   Diagnosis Date    Atrial fibrillation     Hyperlipidemia     Hypertension     Prostate cancer     Skin cancer        Past Surgical Historical:   Past Surgical History:   Procedure Laterality Date    CARDIAC ELECTROPHYSIOLOGY MAPPING AND ABLATION  08/2021    [place], [place], Dr. Olson    COLONOSCOPY  11/2019    PROSTATECTOMY  08/2018    SINUS SURGERY  2016    perforation of rt side of sinus due to tooth extraction        Medications:   Medication List with Changes/Refills   Current Medications    ALLOPURINOL (ZYLOPRIM) 300 MG TABLET    Take 1 tablet by mouth once daily.    DULOXETINE (CYMBALTA) 30 MG CAPSULE    Take 30 mg by mouth.    ELIQUIS 5 MG TAB    Take 1 tablet (5 mg total) by mouth 2 (two) times daily.    FLECAINIDE (TAMBOCOR) 50 MG TAB    Take 1 tablet by mouth 2  (two) times a day.    GABAPENTIN (NEURONTIN) 300 MG CAPSULE    TAKE 1 CAPSULE BY MOUTH 3 TIMES A DAY    HYDROCODONE-ACETAMINOPHEN (NORCO) 5-325 MG PER TABLET    Take 1 tablet by mouth every 6 (six) hours as needed for Pain.    LISINOPRIL (PRINIVIL,ZESTRIL) 20 MG TABLET    Take 1 tablet (20 mg total) by mouth once daily.    PRAVASTATIN (PRAVACHOL) 40 MG TABLET    Take 40 mg by mouth once daily.    TRAMADOL (ULTRAM) 50 MG TABLET    1/2 pill at night        Past Social History: Social History[1]    Allergies:   Review of patient's allergies indicates:   Allergen Reactions    Adhesive tape-silicones         Family History:   Family History   Problem Relation Name Age of Onset    Skin cancer Mother      Heart disease Father      Heart attack Father      Heart disease Brother          Review of Systems:  Review of Systems   Constitutional:  Negative for activity change and appetite change.   HENT:  Negative for congestion and dental problem.    Eyes:  Negative for visual disturbance.   Respiratory:  Negative for chest tightness and shortness of breath.    Cardiovascular:  Negative for chest pain.   Gastrointestinal:  Negative for abdominal distention and abdominal pain.   Genitourinary:  Negative for decreased urine volume, difficulty urinating, dysuria, enuresis, flank pain, frequency, genital sores, hematuria, penile discharge, penile pain, penile swelling, scrotal swelling, testicular pain and urgency.   Musculoskeletal:  Negative for back pain and neck pain.   Skin:  Negative for color change.   Neurological:  Negative for dizziness.   Hematological:  Negative for adenopathy.   Psychiatric/Behavioral:  Negative for agitation, behavioral problems and confusion.        Physical Exam:  Physical Exam  Vitals and nursing note reviewed.   Constitutional:       Appearance: He is well-developed.   HENT:      Head: Normocephalic.   Eyes:      Pupils: Pupils are equal, round, and reactive to light.   Cardiovascular:      Rate  and Rhythm: Normal rate and regular rhythm.      Heart sounds: Normal heart sounds.   Pulmonary:      Effort: Pulmonary effort is normal.      Breath sounds: Normal breath sounds.   Abdominal:      General: Bowel sounds are normal.      Palpations: Abdomen is soft.   Musculoskeletal:         General: Normal range of motion.      Cervical back: Normal range of motion and neck supple.   Skin:     General: Skin is warm and dry.   Neurological:      Mental Status: He is alert and oriented to person, place, and time.   Psychiatric:         Behavior: Behavior normal.       Bladder scan: 30 cc    Assessment/Plan:   1. Prostate cancer: Patient had a radical prostatectomy in 2018 with [facility].    Will check the patient's PSA.  We will notify him of the results.      2. Stress incontinence:  Discussed physical therapy.  Discussed macro plastic and surgical sphincter.    Patient had physical therapy at 1 time.  He will restart exercises at home.  We did discuss timed voiding.      3. Erectile dysfunction:  Patient has failed PDE5 inhibitors.    Patient will start TriMix 30/1/20.  Patient will notify us of the results.  Did discuss risk of priapism.  Brethine included with prescription.    Patient follow-up in 1 year, sooner if needed  Problem List Items Addressed This Visit    None  Visit Diagnoses         Prostate cancer    -  Primary    Relevant Orders    Prostate Specific Antigen, Diagnostic      Male stress incontinence        Relevant Orders    POCT Bladder Scan      Erectile disorder                          [1]   Social History  Socioeconomic History    Marital status:     Number of children: 4   Tobacco Use    Smoking status: Some Days     Types: Cigars    Smokeless tobacco: Never   Substance and Sexual Activity    Alcohol use: Not Currently    Drug use: Never    Sexual activity: Yes     Partners: Female     Social Drivers of Health     Financial Resource Strain: Low Risk  (11/25/2024)     Received from [facility]    Overall Financial Resource Strain (CARDIA)     Difficulty of Paying Living Expenses: Not hard at all   Food Insecurity: No Food Insecurity (11/25/2024)    Received from [facility]    Hunger Vital Sign     Worried About Running Out of Food in the Last Year: Never true     Ran Out of Food in the Last Year: Never true   Transportation Needs: No Transportation Needs (11/25/2024)    Received from [facility]    PRAPARE - Transportation     Lack of Transportation (Medical): No     Lack of Transportation (Non-Medical): No   Housing Stability: Low Risk  (11/25/2024)    [facility]    Housing Stability Vital Sign     Unable to Pay for Housing in the Last Year: No     Number of Times Moved in the Last Year: 0     Homeless in the Last Year: No

## 2025-03-25 ENCOUNTER — RESULTS FOLLOW-UP (OUTPATIENT)
Dept: UROLOGY | Facility: CLINIC | Age: 80
End: 2025-03-25